# Patient Record
Sex: FEMALE | URBAN - METROPOLITAN AREA
[De-identification: names, ages, dates, MRNs, and addresses within clinical notes are randomized per-mention and may not be internally consistent; named-entity substitution may affect disease eponyms.]

---

## 2023-09-13 ENCOUNTER — APPOINTMENT (RX ONLY)
Dept: URBAN - METROPOLITAN AREA CLINIC 236 | Facility: CLINIC | Age: 68
Setting detail: DERMATOLOGY
End: 2023-09-13

## 2023-09-13 DIAGNOSIS — L21.8 OTHER SEBORRHEIC DERMATITIS: ICD-10-CM | Status: INADEQUATELY CONTROLLED

## 2023-09-13 PROCEDURE — ? PRESCRIPTION MEDICATION MANAGEMENT

## 2023-09-13 PROCEDURE — ? PRESCRIPTION

## 2023-09-13 PROCEDURE — ? COUNSELING

## 2023-09-13 PROCEDURE — ? MEDICATION COUNSELING

## 2023-09-13 PROCEDURE — 99204 OFFICE O/P NEW MOD 45 MIN: CPT

## 2023-09-13 RX ORDER — KETOCONAZOLE 20 MG/ML
SHAMPOO, SUSPENSION TOPICAL QWEEK
Qty: 120 | Refills: 6 | Status: ERX | COMMUNITY
Start: 2023-09-13

## 2023-09-13 RX ORDER — FLUCONAZOLE 100 MG/1
TABLET ORAL QD
Qty: 14 | Refills: 0 | Status: ERX | COMMUNITY
Start: 2023-09-13

## 2023-09-13 RX ORDER — KETOCONAZOLE 20 MG/G
CREAM TOPICAL BID
Qty: 60 | Refills: 3 | Status: ERX | COMMUNITY
Start: 2023-09-13

## 2023-09-13 RX ADMIN — KETOCONAZOLE: 20 CREAM TOPICAL at 00:00

## 2023-09-13 RX ADMIN — FLUCONAZOLE: 100 TABLET ORAL at 00:00

## 2023-09-13 RX ADMIN — KETOCONAZOLE: 20 SHAMPOO, SUSPENSION TOPICAL at 00:00

## 2023-09-13 ASSESSMENT — LOCATION ZONE DERM
LOCATION ZONE: NECK
LOCATION ZONE: ARM
LOCATION ZONE: FACE
LOCATION ZONE: TRUNK
LOCATION ZONE: SCALP

## 2023-09-13 ASSESSMENT — LOCATION SIMPLE DESCRIPTION DERM
LOCATION SIMPLE: RIGHT CHEEK
LOCATION SIMPLE: RIGHT FOREARM
LOCATION SIMPLE: LEFT ANTERIOR NECK
LOCATION SIMPLE: LEFT SCALP
LOCATION SIMPLE: CHEST
LOCATION SIMPLE: LEFT FOREARM

## 2023-09-13 ASSESSMENT — LOCATION DETAILED DESCRIPTION DERM
LOCATION DETAILED: LEFT SUPERIOR ANTERIOR NECK
LOCATION DETAILED: LEFT MEDIAL FRONTAL SCALP
LOCATION DETAILED: STERNAL NOTCH
LOCATION DETAILED: RIGHT PROXIMAL RADIAL DORSAL FOREARM
LOCATION DETAILED: RIGHT CENTRAL MALAR CHEEK
LOCATION DETAILED: LEFT DISTAL DORSAL FOREARM

## 2023-09-13 NOTE — HPI: RASH
What Type Of Note Output Would You Prefer (Optional)?: Bullet Format
How Severe Is Your Rash?: moderate
Is This A New Presentation, Or A Follow-Up?: Rash
Additional History: Patient has a history of lymphoma and liver cancer.

## 2023-09-13 NOTE — PROCEDURE: MEDICATION COUNSELING
New Appointment Needed  What is the reason for the visit:    Flu shot and Tdap; Pt's spouse states they need to make sure Pt is up to date with tdap and Flu shot for their daughter will be having a baby 7/17/20. [facility] schedule is closed, please call Pt to schedule to receive vaccines, if possible.  Spouse is aware that the Pt has not been seen in 3 years. Pt has a PHY schedule in July with Dr. Rousseau.  Provider Preference: [facility]  How soon do you need to be seen?: ASAP  Waitlist offered?: No  Okay to leave a detailed message:  Yes     Olumiant Counseling: I discussed with the patient the risks of Olumiant therapy including but not limited to upper respiratory tract infections, shingles, cold sores, and nausea. Live vaccines should be avoided.  This medication has been linked to serious infections; higher rate of mortality; malignancy and lymphoproliferative disorders; major adverse cardiovascular events; thrombosis; gastrointestinal perforations; neutropenia; lymphopenia; anemia; liver enzyme elevations; and lipid elevations.

## 2023-09-13 NOTE — PROCEDURE: MEDICATION COUNSELING
Xelpatoz Pregnancy And Lactation Text: This medication is Pregnancy Category D and is not considered safe during pregnancy.  The risk during breast feeding is also uncertain.

## 2023-09-13 NOTE — PROCEDURE: PRESCRIPTION MEDICATION MANAGEMENT
Detail Level: Zone
Render In Strict Bullet Format?: No
Initiate Treatment: Diflucan 100 mg tablet Qd\\nQuantity: 14.0 Tablet  Days Supply: 14\\nSi tablet po QD for 14 days\\n\\nketoconazole 2 % topical cream BID\\nQuantity: 60.0 g  Days Supply: 30\\nSig: Apply to rash bid x 2 weeks with wet finger tips. Then PRN flares\\n\\nketoconazole 2 % shampoo Qweek\\nQuantity: 120.0 ml  Days Supply: 30\\nSig: Lather on rash in shower TIW Let sit for 5 minutes then rinse
Plan: Mix Sarna lotion 50/50 with ketoconazole cream

## 2025-06-25 NOTE — PROCEDURE: MEDICATION COUNSELING
"Pt alert and oriented x4 on RA. NPO. Plans for colonoscopy tomorrow and ERCP on 6/26. Jaundice present in bilateral eyes and skin. Paracentesis canceled due to small abscess. LR running at 125 ml/hr. SBA.  Goal Outcome Evaluation:    Plan of Care Reviewed With: patient    Overall Patient Progress: improvingOverall Patient Progress: improving    Outcome Evaluation: Alert and oriented. Pt reports no abd pain. Skin and sclera jaundice.    Problem: Adult Inpatient Plan of Care  Goal: Plan of Care Review  Description: The Plan of Care Review/Shift note should be completed every shift.  The Outcome Evaluation is a brief statement about your assessment that the patient is improving, declining, or no change.  This information will be displayed automatically on your shift  note.  6/24/2025 1947 by Jun Jean RN  Outcome: Progressing  6/24/2025 1947 by Jun Jean RN  Outcome: Progressing  6/24/2025 1947 by Jun Jean RN  Outcome: Progressing  Flowsheets (Taken 6/24/2025 1947)  Plan of Care Reviewed With: patient  Overall Patient Progress: improving  6/24/2025 1946 by Jun Jean RN  Outcome: Progressing  Flowsheets (Taken 6/24/2025 1945)  Outcome Evaluation: Alert and oriented. Pt reports no abd pain. Skin and sclera jaundice.  Plan of Care Reviewed With: patient  Overall Patient Progress: improving  6/24/2025 1945 by Jun Jean RN  Outcome: Not Progressing  Flowsheets (Taken 6/24/2025 1945)  Outcome Evaluation: Alert and oriented. Pt reports no abd pain. Skin and sclera jaundice.  Plan of Care Reviewed With: patient  Overall Patient Progress: improving  Goal: Patient-Specific Goal (Individualized)  Description: You can add care plan individualizations to a care plan. Examples of Individualization might be:  \"Parent requests to be called daily at 9am for status\", \"I have a hard time hearing out of my right ear\", or \"Do not touch me to wake me up as it startles  me\".  6/24/2025 1947 " by Jun Jean RN  Outcome: Progressing    Goal: Absence of Hospital-Acquired Illness or Injury  6/24/2025 1947 by Jun Jean RN  Outcome: Progressing    Intervention: Identify and Manage Fall Risk  Recent Flowsheet Documentation  Taken 6/24/2025 1800 by Jun Jean RN  Safety Promotion/Fall Prevention: safety round/check completed  Goal: Optimal Comfort and Wellbeing  6/24/2025 1947 by Jun Jean RN  Outcome: Progressing  Goal: Readiness for Transition of Care  6/24/2025 1947 by Jun Jean RN  Outcome: Progressing     Problem: Fall Injury Risk  Goal: Absence of Fall and Fall-Related Injury  6/24/2025 1947 by Jun Jean RN  Outcome: Progressing  Intervention: Promote Injury-Free Environment  Recent Flowsheet Documentation  Taken 6/24/2025 1800 by Jun Jean RN  Safety Promotion/Fall Prevention: safety round/check completed     Problem: Acute Kidney Injury/Impairment  Goal: Fluid and Electrolyte Balance  6/24/2025 1947 by Jun Jean RN  Outcome: Progressing  6/24/2025 1947 by Jun Jean RN  Goal: Improved Oral Intake  6/24/2025 1947 by Jun Jean RN  Outcome: Progressing  Goal: Effective Renal Function  6/24/2025 1947 by Jun Jean RN  Outcome: Progressing     Problem: Liver Failure  Goal: Optimal Coping with Liver Failure  6/24/2025 1947 by Jun Jean RN  Outcome: Progressing  Goal: Absence of Bleeding  6/24/2025 1947 by Jun Jean RN  Outcome: Progressing  6/24/2025 1947 by Jun Jean RN  Goal: Fluid and Electrolyte Balance  6/24/2025 1947 by Jun Jean RN  Outcome: Progressing  Goal: Optimal Gastrointestinal Function  6/24/2025 1947 by Jun Jean RN  Outcome: Progressing  Goal: Blood Glucose Level Within Target Range  6/24/2025 1947 by Jun Jean RN  Outcome: Progressing  Goal: Hemodynamic Stability  6/24/2025 1947 by Jun eJan, RN  Outcome:  Progressing  Goal: Absence of Infection Signs and Symptoms  6/24/2025 1947 by Jun Jean RN  Outcome: Progressing  Goal: Optimal Neurologic Function  6/24/2025 1947 by Jun Jean RN  Outcome: Progressing  Goal: Improved Oral Intake  6/24/2025 1947 by Jun Jean RN  Outcome: Progressing  6/24/2025 1947 by Jun Jean RN  Goal: Optimal Pain Control, Comfort and Function  6/24/2025 1947 by Jun Jean RN  Outcome: Progressing  Goal: Effective Oxygenation and Ventilation  6/24/2025 1947 by Jun Jean RN  Outcome: Progressing  Intervention: Promote Airway Secretion Clearance  Recent Flowsheet Documentation  Taken 6/24/2025 1800 by Jun Jean RN  Cough And Deep Breathing: done independently per patient          Sotyktu Pregnancy And Lactation Text: There is insufficient data to evaluate whether or not Sotyktu is safe to use during pregnancy.   It is not known if Sotyktu passes into breast milk and whether or not it is safe to use when breastfeeding.